# Patient Record
Sex: FEMALE | Race: WHITE | NOT HISPANIC OR LATINO | URBAN - METROPOLITAN AREA
[De-identification: names, ages, dates, MRNs, and addresses within clinical notes are randomized per-mention and may not be internally consistent; named-entity substitution may affect disease eponyms.]

---

## 2017-10-19 ENCOUNTER — OUTPATIENT (OUTPATIENT)
Dept: OUTPATIENT SERVICES | Facility: HOSPITAL | Age: 82
LOS: 1 days | Discharge: HOME | End: 2017-10-19
Payer: MEDICARE

## 2017-10-19 PROCEDURE — 90792 PSYCH DIAG EVAL W/MED SRVCS: CPT

## 2017-10-20 DIAGNOSIS — F41.1 GENERALIZED ANXIETY DISORDER: ICD-10-CM

## 2018-10-29 ENCOUNTER — EMERGENCY (EMERGENCY)
Facility: HOSPITAL | Age: 83
LOS: 1 days | Discharge: HOME | End: 2018-10-29
Attending: EMERGENCY MEDICINE | Admitting: EMERGENCY MEDICINE
Payer: MEDICARE

## 2018-10-29 VITALS
SYSTOLIC BLOOD PRESSURE: 140 MMHG | DIASTOLIC BLOOD PRESSURE: 68 MMHG | RESPIRATION RATE: 16 BRPM | OXYGEN SATURATION: 97 % | TEMPERATURE: 98 F | HEART RATE: 83 BPM

## 2018-10-29 VITALS
HEART RATE: 76 BPM | SYSTOLIC BLOOD PRESSURE: 146 MMHG | HEIGHT: 63 IN | TEMPERATURE: 97 F | WEIGHT: 149.91 LBS | RESPIRATION RATE: 16 BRPM | DIASTOLIC BLOOD PRESSURE: 86 MMHG | OXYGEN SATURATION: 96 %

## 2018-10-29 LAB
ALBUMIN SERPL ELPH-MCNC: 3.1 G/DL — LOW (ref 3.3–5)
ALP SERPL-CCNC: 68 U/L — SIGNIFICANT CHANGE UP (ref 30–120)
ALT FLD-CCNC: 22 U/L DA — SIGNIFICANT CHANGE UP (ref 10–60)
ANION GAP SERPL CALC-SCNC: 8 MMOL/L — SIGNIFICANT CHANGE UP (ref 5–17)
APPEARANCE UR: ABNORMAL
APTT BLD: 39.9 SEC — HIGH (ref 27.5–37.4)
AST SERPL-CCNC: 40 U/L — SIGNIFICANT CHANGE UP (ref 10–40)
BASOPHILS # BLD AUTO: 0.01 K/UL — SIGNIFICANT CHANGE UP (ref 0–0.2)
BASOPHILS NFR BLD AUTO: 0.1 % — SIGNIFICANT CHANGE UP (ref 0–2)
BILIRUB SERPL-MCNC: 0.3 MG/DL — SIGNIFICANT CHANGE UP (ref 0.2–1.2)
BILIRUB UR-MCNC: NEGATIVE — SIGNIFICANT CHANGE UP
BUN SERPL-MCNC: 15 MG/DL — SIGNIFICANT CHANGE UP (ref 7–23)
CALCIUM SERPL-MCNC: 9.3 MG/DL — SIGNIFICANT CHANGE UP (ref 8.4–10.5)
CHLORIDE SERPL-SCNC: 105 MMOL/L — SIGNIFICANT CHANGE UP (ref 96–108)
CO2 SERPL-SCNC: 25 MMOL/L — SIGNIFICANT CHANGE UP (ref 22–31)
COLOR SPEC: SIGNIFICANT CHANGE UP
CREAT SERPL-MCNC: 0.97 MG/DL — SIGNIFICANT CHANGE UP (ref 0.5–1.3)
DIFF PNL FLD: NEGATIVE — SIGNIFICANT CHANGE UP
EOSINOPHIL # BLD AUTO: 0.12 K/UL — SIGNIFICANT CHANGE UP (ref 0–0.5)
EOSINOPHIL NFR BLD AUTO: 1.2 % — SIGNIFICANT CHANGE UP (ref 0–6)
GLUCOSE SERPL-MCNC: 95 MG/DL — SIGNIFICANT CHANGE UP (ref 70–99)
GLUCOSE UR QL: NEGATIVE MG/DL — SIGNIFICANT CHANGE UP
HCT VFR BLD CALC: 38.8 % — SIGNIFICANT CHANGE UP (ref 34.5–45)
HGB BLD-MCNC: 12.2 G/DL — SIGNIFICANT CHANGE UP (ref 11.5–15.5)
IMM GRANULOCYTES NFR BLD AUTO: 0.6 % — SIGNIFICANT CHANGE UP (ref 0–1.5)
INR BLD: 1.51 RATIO — HIGH (ref 0.88–1.16)
KETONES UR-MCNC: NEGATIVE — SIGNIFICANT CHANGE UP
LEUKOCYTE ESTERASE UR-ACNC: ABNORMAL
LYMPHOCYTES # BLD AUTO: 2.28 K/UL — SIGNIFICANT CHANGE UP (ref 1–3.3)
LYMPHOCYTES # BLD AUTO: 23.6 % — SIGNIFICANT CHANGE UP (ref 13–44)
MCHC RBC-ENTMCNC: 30.6 PG — SIGNIFICANT CHANGE UP (ref 27–34)
MCHC RBC-ENTMCNC: 31.4 GM/DL — LOW (ref 32–36)
MCV RBC AUTO: 97.2 FL — SIGNIFICANT CHANGE UP (ref 80–100)
MONOCYTES # BLD AUTO: 0.81 K/UL — SIGNIFICANT CHANGE UP (ref 0–0.9)
MONOCYTES NFR BLD AUTO: 8.4 % — SIGNIFICANT CHANGE UP (ref 2–14)
NEUTROPHILS # BLD AUTO: 6.4 K/UL — SIGNIFICANT CHANGE UP (ref 1.8–7.4)
NEUTROPHILS NFR BLD AUTO: 66.1 % — SIGNIFICANT CHANGE UP (ref 43–77)
NITRITE UR-MCNC: POSITIVE
PH UR: 6 — SIGNIFICANT CHANGE UP (ref 5–8)
PLATELET # BLD AUTO: 223 K/UL — SIGNIFICANT CHANGE UP (ref 150–400)
POTASSIUM SERPL-MCNC: 6.1 MMOL/L — HIGH (ref 3.5–5.3)
POTASSIUM SERPL-SCNC: 6.1 MMOL/L — HIGH (ref 3.5–5.3)
PROT SERPL-MCNC: 7.3 G/DL — SIGNIFICANT CHANGE UP (ref 6–8.3)
PROT UR-MCNC: 30 MG/DL
PROTHROM AB SERPL-ACNC: 16.6 SEC — HIGH (ref 9.8–12.7)
RBC # BLD: 3.99 M/UL — SIGNIFICANT CHANGE UP (ref 3.8–5.2)
RBC # FLD: 13.6 % — SIGNIFICANT CHANGE UP (ref 10.3–14.5)
SODIUM SERPL-SCNC: 138 MMOL/L — SIGNIFICANT CHANGE UP (ref 135–145)
SP GR SPEC: 1.03 — HIGH (ref 1.01–1.02)
UROBILINOGEN FLD QL: 4 MG/DL
WBC # BLD: 9.68 K/UL — SIGNIFICANT CHANGE UP (ref 3.8–10.5)
WBC # FLD AUTO: 9.68 K/UL — SIGNIFICANT CHANGE UP (ref 3.8–10.5)

## 2018-10-29 PROCEDURE — 80053 COMPREHEN METABOLIC PANEL: CPT

## 2018-10-29 PROCEDURE — 81001 URINALYSIS AUTO W/SCOPE: CPT

## 2018-10-29 PROCEDURE — 87086 URINE CULTURE/COLONY COUNT: CPT

## 2018-10-29 PROCEDURE — 70450 CT HEAD/BRAIN W/O DYE: CPT | Mod: 26

## 2018-10-29 PROCEDURE — 96366 THER/PROPH/DIAG IV INF ADDON: CPT

## 2018-10-29 PROCEDURE — 85027 COMPLETE CBC AUTOMATED: CPT

## 2018-10-29 PROCEDURE — 70450 CT HEAD/BRAIN W/O DYE: CPT

## 2018-10-29 PROCEDURE — 72125 CT NECK SPINE W/O DYE: CPT | Mod: 26

## 2018-10-29 PROCEDURE — 85610 PROTHROMBIN TIME: CPT

## 2018-10-29 PROCEDURE — 99284 EMERGENCY DEPT VISIT MOD MDM: CPT

## 2018-10-29 PROCEDURE — 99284 EMERGENCY DEPT VISIT MOD MDM: CPT | Mod: 25

## 2018-10-29 PROCEDURE — 72125 CT NECK SPINE W/O DYE: CPT

## 2018-10-29 PROCEDURE — 85730 THROMBOPLASTIN TIME PARTIAL: CPT

## 2018-10-29 RX ORDER — CEFTRIAXONE 500 MG/1
1 INJECTION, POWDER, FOR SOLUTION INTRAMUSCULAR; INTRAVENOUS ONCE
Qty: 0 | Refills: 0 | Status: COMPLETED | OUTPATIENT
Start: 2018-10-29 | End: 2018-10-29

## 2018-10-29 RX ORDER — CEFUROXIME AXETIL 250 MG
1 TABLET ORAL
Qty: 14 | Refills: 0
Start: 2018-10-29 | End: 2018-11-04

## 2018-10-29 RX ADMIN — CEFTRIAXONE 1 GRAM(S): 500 INJECTION, POWDER, FOR SOLUTION INTRAMUSCULAR; INTRAVENOUS at 20:50

## 2018-10-29 RX ADMIN — CEFTRIAXONE 100 GRAM(S): 500 INJECTION, POWDER, FOR SOLUTION INTRAMUSCULAR; INTRAVENOUS at 20:20

## 2018-10-29 NOTE — ED PROVIDER NOTE - ATTENDING CONTRIBUTION TO CARE
I, Dr Lucas, have personally performed a face to face diagnostic evaluation on this patient with the PA/NP. I have reviewed the PA/NP's note and agree with the history, Physical exam and plan of care, As per history 83 y female poor historian,  sent to ED from [facility], for fall,  son at bedside states she has had few falls recently,  recenty moved into Assisted Living ,  has been diagnosed with depression, has recently started new medications.  PMH: BPH (benign prostatic hyperplasia)  ,Hypertension  , Skin cancer, Diabetes mellitus  ,HLD (hyperlipidemia),   Obese  ,Polyp of corpus uteri  ,Staples-Leventhal syndrome , Pulmonary Embolus.  patient is on Eliquis  patient denies chest pain, sob, no abdominal pain, no neck or back pain,  states has pain left ear.  son states few days ago she fell and hit her left ear/head on nightstand, review of imaging study and lab work were unremarkable PE no obvious gross injury from fall noted neuro intact.

## 2018-10-29 NOTE — ED PROVIDER NOTE - OBJECTIVE STATEMENT
83 y female poor historian,  sent to ED from [facility], for fall,  son at bedside states she has had few falls recently,  recenty moved into Assisted Living ,  has been diagnosed with depression, has recently started new medications.  PMH: BPH (benign prostatic hyperplasia)  ,Hypertension  , Skin cancer, Diabetes mellitus  ,HLD (hyperlipidemia),   Obese  ,Polyp of corpus uteri  ,Staples-Leventhal syndrome , Pulmonary Embolus.  patient is on Eliquis  patient denies chest pain, sob, no abdominal pain, no neck or back pain,  states has pain left ear.  son states few days ago she fell and hit her left ear/head on nightstand

## 2018-10-29 NOTE — ED ADULT NURSE NOTE - NSIMPLEMENTINTERV_GEN_ALL_ED
Implemented All Fall with Harm Risk Interventions:  [place] to call system. Call bell, personal items and telephone within reach. Instruct patient to call for assistance. Room bathroom lighting operational. Non-slip footwear when patient is off stretcher. Physically safe environment: no spills, clutter or unnecessary equipment. Stretcher in lowest position, wheels locked, appropriate side rails in place. Provide visual cue, wrist band, yellow gown, etc. Monitor gait and stability. Monitor for mental status changes and reorient to person, place, and time. Review medications for side effects contributing to fall risk. Reinforce activity limits and safety measures with patient and family. Provide visual clues: red socks.

## 2018-10-29 NOTE — ED PROVIDER NOTE - PROGRESS NOTE DETAILS
as per lab, specimen slightly hemolyzed patient resting comfortably, son at bedside , results discussed, left thyroid nodule.  follow up with Dr Barkley

## 2018-10-29 NOTE — ED PROVIDER NOTE - PMH
Diabetes mellitus    HLD (hyperlipidemia)    Hypertension    Obese    Polyp of corpus uteri    Staples-Leventhal syndrome

## 2018-10-31 LAB
CULTURE RESULTS: SIGNIFICANT CHANGE UP
SPECIMEN SOURCE: SIGNIFICANT CHANGE UP

## 2018-11-07 ENCOUNTER — EMERGENCY (EMERGENCY)
Facility: HOSPITAL | Age: 83
LOS: 1 days | Discharge: HOME | End: 2018-11-07
Attending: EMERGENCY MEDICINE | Admitting: EMERGENCY MEDICINE
Payer: MEDICARE

## 2018-11-07 VITALS
DIASTOLIC BLOOD PRESSURE: 85 MMHG | HEART RATE: 79 BPM | TEMPERATURE: 98 F | SYSTOLIC BLOOD PRESSURE: 139 MMHG | WEIGHT: 160.06 LBS | HEIGHT: 62 IN | OXYGEN SATURATION: 95 % | RESPIRATION RATE: 18 BRPM

## 2018-11-07 VITALS
OXYGEN SATURATION: 95 % | TEMPERATURE: 98 F | DIASTOLIC BLOOD PRESSURE: 79 MMHG | RESPIRATION RATE: 18 BRPM | HEART RATE: 95 BPM | SYSTOLIC BLOOD PRESSURE: 145 MMHG

## 2018-11-07 PROCEDURE — 72170 X-RAY EXAM OF PELVIS: CPT

## 2018-11-07 PROCEDURE — 72125 CT NECK SPINE W/O DYE: CPT | Mod: 26

## 2018-11-07 PROCEDURE — 73502 X-RAY EXAM HIP UNI 2-3 VIEWS: CPT

## 2018-11-07 PROCEDURE — 73502 X-RAY EXAM HIP UNI 2-3 VIEWS: CPT | Mod: 26,LT

## 2018-11-07 PROCEDURE — 93005 ELECTROCARDIOGRAM TRACING: CPT

## 2018-11-07 PROCEDURE — 93010 ELECTROCARDIOGRAM REPORT: CPT

## 2018-11-07 PROCEDURE — 70450 CT HEAD/BRAIN W/O DYE: CPT | Mod: 26

## 2018-11-07 PROCEDURE — 99284 EMERGENCY DEPT VISIT MOD MDM: CPT

## 2018-11-07 PROCEDURE — 72125 CT NECK SPINE W/O DYE: CPT

## 2018-11-07 PROCEDURE — 99284 EMERGENCY DEPT VISIT MOD MDM: CPT | Mod: 25

## 2018-11-07 PROCEDURE — 70450 CT HEAD/BRAIN W/O DYE: CPT

## 2018-11-07 NOTE — ED ADULT NURSE NOTE - NSIMPLEMENTINTERV_GEN_ALL_ED
Implemented All Fall with Harm Risk Interventions:  [place] to call system. Call bell, personal items and telephone within reach. Instruct patient to call for assistance. Room bathroom lighting operational. Non-slip footwear when patient is off stretcher. Physically safe environment: no spills, clutter or unnecessary equipment. Stretcher in lowest position, wheels locked, appropriate side rails in place. Provide visual cue, wrist band, yellow gown, etc. Monitor gait and stability. Monitor for mental status changes and reorient to person, place, and time. Review medications for side effects contributing to fall risk. Reinforce activity limits and safety measures with patient and family. Provide visual clues: red socks.

## 2018-11-07 NOTE — ED PROVIDER NOTE - OBJECTIVE STATEMENT
82 y/o F pt with hx of DM, HTN, and HLD BIBA from [facility] presents to the ED c/o pain on the back of the head and pain on the left hip s/p fall today. Pt states that she does not remember how she fell or what she was doing at the time of the fall. She fell backwards and hit the back of her head on the ground. Pain aggravated with movement and palpation.  Pt states that she gets "dizzy spells" occasionally, associated with frequent falls. Denies chest pain, abd pain, weakness or numbness in extremities, or blurring of vision. No other complaints at this time.

## 2018-11-07 NOTE — ED PROVIDER NOTE - CARE PLAN
Principal Discharge DX:	Fall, initial encounter  Secondary Diagnosis:	Injury of head, initial encounter

## 2018-11-07 NOTE — ED PROVIDER NOTE - PSH
Polyp of corpus uteri  2005  Pregnancy, ectopic, appendectomy  1961  S/P breast lumpectomy  left 1988

## 2018-11-07 NOTE — ED ADULT NURSE NOTE - OBJECTIVE STATEMENT
Brought in from Assisted Living s/p fall today. Pt unable to give any details. Stated " I can't remember "  Complaining of left hip pain on movement.

## 2018-11-07 NOTE — ED ADULT NURSE NOTE - CHIEF COMPLAINT QUOTE
Patient sent from [facility] Assisted living, s/p fall on eliquis with external signs of head trauma. patient reports occipital head pain upon palpation. reports she does not recall events of fall, potential LOC. GCS 15 in triage, patient is Hard of hearing.

## 2018-11-07 NOTE — ED ADULT TRIAGE NOTE - CHIEF COMPLAINT QUOTE
Patient sent from [facility] living, s/p fall on eliquis with external signs of head trauma. patient reports occipital head pain upon palpation. Patient sent from [facility] Assisted living, s/p fall on eliquis with external signs of head trauma. patient reports occipital head pain upon palpation. reports she does not recall events of fall, potential LOC. GCS 15 in triage, patient is Hard of hearing.

## 2018-11-07 NOTE — ED PROVIDER NOTE - NEUROLOGICAL, MLM
Alert , no focal deficits, no motor or sensory deficits, swelling on the posterior aspect of the scalp, tenderness on palpation of the posterior aspect of the scalp

## 2018-11-07 NOTE — ED PROVIDER NOTE - LOWER EXTREMITY EXAM, LEFT
TENDERNESS/tenderness on palpation of the left hip region. Pain increases on movement of the left leg.

## 2019-09-10 ENCOUNTER — EMERGENCY (EMERGENCY)
Facility: HOSPITAL | Age: 84
LOS: 1 days | Discharge: HOME | End: 2019-09-10
Attending: EMERGENCY MEDICINE | Admitting: EMERGENCY MEDICINE
Payer: MEDICARE

## 2019-09-10 VITALS
RESPIRATION RATE: 16 BRPM | DIASTOLIC BLOOD PRESSURE: 72 MMHG | TEMPERATURE: 98 F | HEART RATE: 63 BPM | HEIGHT: 62 IN | OXYGEN SATURATION: 97 % | SYSTOLIC BLOOD PRESSURE: 126 MMHG | WEIGHT: 149.91 LBS

## 2019-09-10 VITALS
HEART RATE: 65 BPM | SYSTOLIC BLOOD PRESSURE: 125 MMHG | DIASTOLIC BLOOD PRESSURE: 74 MMHG | RESPIRATION RATE: 15 BRPM | OXYGEN SATURATION: 98 %

## 2019-09-10 LAB
ALBUMIN SERPL ELPH-MCNC: 3.4 G/DL — SIGNIFICANT CHANGE UP (ref 3.3–5)
ALP SERPL-CCNC: 64 U/L — SIGNIFICANT CHANGE UP (ref 30–120)
ALT FLD-CCNC: 19 U/L DA — SIGNIFICANT CHANGE UP (ref 10–60)
ANION GAP SERPL CALC-SCNC: 6 MMOL/L — SIGNIFICANT CHANGE UP (ref 5–17)
APTT BLD: 31.9 SEC — SIGNIFICANT CHANGE UP (ref 28.5–37)
AST SERPL-CCNC: 17 U/L — SIGNIFICANT CHANGE UP (ref 10–40)
BASOPHILS # BLD AUTO: 0.01 K/UL — SIGNIFICANT CHANGE UP (ref 0–0.2)
BASOPHILS NFR BLD AUTO: 0.1 % — SIGNIFICANT CHANGE UP (ref 0–2)
BILIRUB SERPL-MCNC: 0.2 MG/DL — SIGNIFICANT CHANGE UP (ref 0.2–1.2)
BUN SERPL-MCNC: 17 MG/DL — SIGNIFICANT CHANGE UP (ref 7–23)
CALCIUM SERPL-MCNC: 9.4 MG/DL — SIGNIFICANT CHANGE UP (ref 8.4–10.5)
CHLORIDE SERPL-SCNC: 106 MMOL/L — SIGNIFICANT CHANGE UP (ref 96–108)
CO2 SERPL-SCNC: 27 MMOL/L — SIGNIFICANT CHANGE UP (ref 22–31)
CREAT SERPL-MCNC: 1.02 MG/DL — SIGNIFICANT CHANGE UP (ref 0.5–1.3)
D DIMER BLD IA.RAPID-MCNC: <150 NG/ML DDU — SIGNIFICANT CHANGE UP
EOSINOPHIL # BLD AUTO: 0.1 K/UL — SIGNIFICANT CHANGE UP (ref 0–0.5)
EOSINOPHIL NFR BLD AUTO: 0.9 % — SIGNIFICANT CHANGE UP (ref 0–6)
GLUCOSE BLDC GLUCOMTR-MCNC: 124 MG/DL — HIGH (ref 70–99)
GLUCOSE SERPL-MCNC: 173 MG/DL — HIGH (ref 70–99)
HCT VFR BLD CALC: 41.3 % — SIGNIFICANT CHANGE UP (ref 34.5–45)
HGB BLD-MCNC: 12.9 G/DL — SIGNIFICANT CHANGE UP (ref 11.5–15.5)
IMM GRANULOCYTES NFR BLD AUTO: 0.3 % — SIGNIFICANT CHANGE UP (ref 0–1.5)
INR BLD: 1.04 RATIO — SIGNIFICANT CHANGE UP (ref 0.88–1.16)
LYMPHOCYTES # BLD AUTO: 28.1 % — SIGNIFICANT CHANGE UP (ref 13–44)
LYMPHOCYTES # BLD AUTO: 3.22 K/UL — SIGNIFICANT CHANGE UP (ref 1–3.3)
MCHC RBC-ENTMCNC: 30.1 PG — SIGNIFICANT CHANGE UP (ref 27–34)
MCHC RBC-ENTMCNC: 31.2 GM/DL — LOW (ref 32–36)
MCV RBC AUTO: 96.5 FL — SIGNIFICANT CHANGE UP (ref 80–100)
MONOCYTES # BLD AUTO: 0.9 K/UL — SIGNIFICANT CHANGE UP (ref 0–0.9)
MONOCYTES NFR BLD AUTO: 7.9 % — SIGNIFICANT CHANGE UP (ref 2–14)
NEUTROPHILS # BLD AUTO: 7.17 K/UL — SIGNIFICANT CHANGE UP (ref 1.8–7.4)
NEUTROPHILS NFR BLD AUTO: 62.7 % — SIGNIFICANT CHANGE UP (ref 43–77)
NRBC # BLD: 0 /100 WBCS — SIGNIFICANT CHANGE UP (ref 0–0)
PLATELET # BLD AUTO: 227 K/UL — SIGNIFICANT CHANGE UP (ref 150–400)
POTASSIUM SERPL-MCNC: 4 MMOL/L — SIGNIFICANT CHANGE UP (ref 3.5–5.3)
POTASSIUM SERPL-SCNC: 4 MMOL/L — SIGNIFICANT CHANGE UP (ref 3.5–5.3)
PROT SERPL-MCNC: 7 G/DL — SIGNIFICANT CHANGE UP (ref 6–8.3)
PROTHROM AB SERPL-ACNC: 11.4 SEC — SIGNIFICANT CHANGE UP (ref 10–12.9)
RBC # BLD: 4.28 M/UL — SIGNIFICANT CHANGE UP (ref 3.8–5.2)
RBC # FLD: 13.1 % — SIGNIFICANT CHANGE UP (ref 10.3–14.5)
SODIUM SERPL-SCNC: 139 MMOL/L — SIGNIFICANT CHANGE UP (ref 135–145)
TROPONIN I SERPL-MCNC: 0.02 NG/ML — LOW (ref 0.02–0.06)
TROPONIN I SERPL-MCNC: 0.02 NG/ML — SIGNIFICANT CHANGE UP (ref 0.02–0.06)
WBC # BLD: 11.44 K/UL — HIGH (ref 3.8–10.5)
WBC # FLD AUTO: 11.44 K/UL — HIGH (ref 3.8–10.5)

## 2019-09-10 PROCEDURE — 36415 COLL VENOUS BLD VENIPUNCTURE: CPT

## 2019-09-10 PROCEDURE — 71045 X-RAY EXAM CHEST 1 VIEW: CPT

## 2019-09-10 PROCEDURE — 85730 THROMBOPLASTIN TIME PARTIAL: CPT

## 2019-09-10 PROCEDURE — 99284 EMERGENCY DEPT VISIT MOD MDM: CPT | Mod: 25

## 2019-09-10 PROCEDURE — 84484 ASSAY OF TROPONIN QUANT: CPT

## 2019-09-10 PROCEDURE — 85610 PROTHROMBIN TIME: CPT

## 2019-09-10 PROCEDURE — 82962 GLUCOSE BLOOD TEST: CPT

## 2019-09-10 PROCEDURE — 80053 COMPREHEN METABOLIC PANEL: CPT

## 2019-09-10 PROCEDURE — 99284 EMERGENCY DEPT VISIT MOD MDM: CPT

## 2019-09-10 PROCEDURE — 93010 ELECTROCARDIOGRAM REPORT: CPT

## 2019-09-10 PROCEDURE — 85027 COMPLETE CBC AUTOMATED: CPT

## 2019-09-10 PROCEDURE — 85379 FIBRIN DEGRADATION QUANT: CPT

## 2019-09-10 PROCEDURE — 93005 ELECTROCARDIOGRAM TRACING: CPT

## 2019-09-10 PROCEDURE — 71045 X-RAY EXAM CHEST 1 VIEW: CPT | Mod: 26

## 2019-09-10 RX ORDER — METFORMIN HYDROCHLORIDE 850 MG/1
1 TABLET ORAL
Qty: 0 | Refills: 0 | DISCHARGE

## 2019-09-10 RX ORDER — LOSARTAN POTASSIUM 100 MG/1
0 TABLET, FILM COATED ORAL
Qty: 0 | Refills: 0 | DISCHARGE

## 2019-09-10 RX ORDER — ROSUVASTATIN CALCIUM 5 MG/1
1 TABLET ORAL
Qty: 0 | Refills: 0 | DISCHARGE

## 2019-09-10 RX ORDER — ACETAMINOPHEN 500 MG
650 TABLET ORAL ONCE
Refills: 0 | Status: COMPLETED | OUTPATIENT
Start: 2019-09-10 | End: 2019-09-10

## 2019-09-10 RX ORDER — VENLAFAXINE HCL 75 MG
0 CAPSULE, EXT RELEASE 24 HR ORAL
Qty: 0 | Refills: 0 | DISCHARGE

## 2019-09-10 RX ORDER — TOPIRAMATE 25 MG
0 TABLET ORAL
Qty: 0 | Refills: 0 | DISCHARGE

## 2019-09-10 RX ORDER — APIXABAN 2.5 MG/1
1 TABLET, FILM COATED ORAL
Qty: 0 | Refills: 0 | DISCHARGE

## 2019-09-10 RX ORDER — VENLAFAXINE HCL 75 MG
1 CAPSULE, EXT RELEASE 24 HR ORAL
Qty: 0 | Refills: 0 | DISCHARGE

## 2019-09-10 RX ORDER — ATENOLOL 25 MG/1
1 TABLET ORAL
Qty: 0 | Refills: 0 | DISCHARGE

## 2019-09-10 RX ADMIN — Medication 650 MILLIGRAM(S): at 17:15

## 2019-09-10 RX ADMIN — Medication 650 MILLIGRAM(S): at 17:45

## 2019-09-10 NOTE — CONSULT NOTE ADULT - ASSESSMENT
The patient is an 84 year old female with a history of HTN, DM, PE, dementia who presents with chest pain.     Plan:  - ECG with no evidence of ischemia or infarction  - First troponin negative. Rule out acute MI with two sets of cardiac enzymes.  - D-dimer negative making PE unlikely  - Check CXR  - If above work-up negative, patient can be discharged from a cardiac perspective

## 2019-09-10 NOTE — ED ADULT NURSE REASSESSMENT NOTE - COMFORT CARE
side rails up/wait time explained/meal provided/plan of care explained/repositioned/po fluids offered

## 2019-09-10 NOTE — ED PROVIDER NOTE - OBJECTIVE STATEMENT
85 y/o female with a PMHx of Dementia, HTN, DM, PE, Anxiety, Depression presents to the ED c/o chest pain x today. Pt coming from [facility]. Pt states that she feels a mild ache on her left breast. Pt is a poor historian due to hx of dementia, HPI and ROS incomplete. PCP: Dr. Filippo Waldrop.

## 2019-09-10 NOTE — CONSULT NOTE ADULT - SUBJECTIVE AND OBJECTIVE BOX
History of Present Illness: The patient is an 84 year old female with a history of HTN, DM, PE, dementia who presents with chest pain. The pain is left-sided, sharp, non-radiating, non-exertional. It began about 3 hours ago and has been intermittent. No associated shortness of breath, dizziness, palpitations.    Past Medical/Surgical History:  HTN, DM, PE, dementia    Medications:  Home Medications:  atenolol 25 mg oral tablet: 1 tab(s) orally once a day (10 Sep 2019 12:55)  Eliquis 5 mg oral tablet: 1 tab(s) orally 2 times a day (10 Sep 2019 12:55)  glipiZIDE 2.5 mg oral tablet, extended release: 2 tab(s) orally once a day (10 Sep 2019 12:55)  glipiZIDE 2.5 mg oral tablet, extended release: 1 tab(s) orally once a day evening (10 Sep 2019 12:55)  losartan 50 mg oral tablet: orally 2 times a day (10 Sep 2019 12:55)  metFORMIN 1000 mg oral tablet: 1 tab(s) orally 2 times a day (10 Sep 2019 12:55)  rosuvastatin 20 mg oral tablet: 1 tab(s) orally once a day (at bedtime) (10 Sep 2019 12:55)  topiramate 25 mg oral tablet: orally once a day (10 Sep 2019 12:55)  venlafaxine 150 mg oral capsule, extended release: 1 cap(s) orally once a day (10 Sep 2019 12:55)  venlafaxine 37.5 mg oral tablet: orally once a day (10 Sep 2019 12:55)      Family History: Non-contributory family history of premature cardiovascular atherosclerotic disease    Social History: No tobacco, alcohol or drug use    Review of Systems:  General: No fevers, chills, weight loss or gain  Skin: No rashes, color changes  Cardiovascular: No chest pain, orthopnea  Respiratory: No shortness of breath, cough  Gastrointestinal: No nausea, abdominal pain  Genitourinary: No incontinence, pain with urination  Musculoskeletal: No pain, swelling, decreased range of motion  Neurological: No headache, weakness  Psychiatric: No depression, anxiety  Endocrine: No weight loss or gain, increased thirst  All other systems are comprehensively negative.    Physical Exam:  Vitals:        Vital Signs Last 24 Hrs  T(C): 36.8 (10 Sep 2019 12:41), Max: 36.8 (10 Sep 2019 12:41)  T(F): 98.2 (10 Sep 2019 12:41), Max: 98.2 (10 Sep 2019 12:41)  HR: 63 (10 Sep 2019 12:41) (63 - 63)  BP: 126/72 (10 Sep 2019 12:41) (126/72 - 126/72)  BP(mean): --  RR: 16 (10 Sep 2019 12:41) (16 - 16)  SpO2: 97% (10 Sep 2019 12:41) (97% - 97%)  General: NAD  HEENT: MMM  Neck: No JVD, no carotid bruit  Lungs: CTAB  CV: RRR, nl S1/S2, no M/R/G  Abdomen: S/NT/ND, +BS  Extremities: No LE edema, no cyanosis  Neuro: AAOx3, non-focal  Skin: No rash    Labs:                        12.9   11.44 )-----------( 227      ( 10 Sep 2019 12:52 )             41.3     09-10    139  |  106  |  17  ----------------------------<  173<H>  4.0   |  27  |  1.02    Ca    9.4      10 Sep 2019 12:52    TPro  7.0  /  Alb  3.4  /  TBili  0.2  /  DBili  x   /  AST  17  /  ALT  19  /  AlkPhos  64  09-10    CARDIAC MARKERS ( 10 Sep 2019 12:52 )  .016 ng/mL / x     / x     / x     / x          PT/INR - ( 10 Sep 2019 12:52 )   PT: 11.4 sec;   INR: 1.04 ratio         PTT - ( 10 Sep 2019 12:52 )  PTT:31.9 sec    ECG: NSR, LAD, nonspecific ST abnormality

## 2019-09-10 NOTE — ED ADULT NURSE NOTE - CHPI ED NUR SYMPTOMS NEG
no diaphoresis/no nausea/no syncope/no back pain/no vomiting/no chest pain/no congestion/no dizziness/no shortness of breath/no fever

## 2019-09-10 NOTE — ED PROVIDER NOTE - CONSTITUTIONAL, MLM
normal... Well appearing, well nourished, awake, alert, oriented to person, place, time/situation +confused.

## 2019-09-10 NOTE — ED PROVIDER NOTE - CARDIAC, MLM
Normal rate, regular rhythm.  Heart sounds S1, S2.  No murmurs, rubs or gallops. + chest pain reproducible on left side

## 2019-09-10 NOTE — ED ADULT NURSE NOTE - OBJECTIVE STATEMENT
recd pt  Awake and alert, confused to time/year, pt sent from [facility]. Pt states that she felt a mild ache on her left breast. Pt is a poor historian due to hx of dementia . states the pain is gone now. denies sob. Respirations are even and unlabored, lungs cta, +bowel x4 quads, abdomen soft, nontender/nondistended, no guarding, rebound or rigidity noted, skin w/d/i.

## 2019-09-10 NOTE — ED ADULT NURSE NOTE - NSIMPLEMENTINTERV_GEN_ALL_ED
Implemented All Fall with Harm Risk Interventions:  [place] to call system. Call bell, personal items and telephone within reach. Instruct patient to call for assistance. Room bathroom lighting operational. Non-slip footwear when patient is off stretcher. Physically safe environment: no spills, clutter or unnecessary equipment. Stretcher in lowest position, wheels locked, appropriate side rails in place. Provide visual cue, wrist band, yellow gown, etc. Monitor gait and stability. Monitor for mental status changes and reorient to person, place, and time. Review medications for side effects contributing to fall risk. Reinforce activity limits and safety measures with patient and family. Provide visual clues: red socks.

## 2019-09-10 NOTE — ED PROVIDER NOTE - PATIENT PORTAL LINK FT
You can access the FollowMyHealth Patient Portal offered by [facility] by registering at the following website: [email or web address]. By joining [employer]’s FollowMyHealth portal, you will also be able to view your health information using other applications (apps) compatible with our system.

## 2021-07-26 NOTE — ED PROVIDER NOTE - CARE PLAN
Principal Discharge DX:	Fall, initial encounter
Unrestricted diet/activity/Recommended medical follow-up following discharge...

## 2022-04-13 ENCOUNTER — EMERGENCY (EMERGENCY)
Facility: HOSPITAL | Age: 87
LOS: 1 days | Discharge: HOME | End: 2022-04-13
Attending: STUDENT IN AN ORGANIZED HEALTH CARE EDUCATION/TRAINING PROGRAM | Admitting: STUDENT IN AN ORGANIZED HEALTH CARE EDUCATION/TRAINING PROGRAM
Payer: MEDICARE

## 2022-04-13 VITALS
RESPIRATION RATE: 16 BRPM | OXYGEN SATURATION: 98 % | HEART RATE: 67 BPM | SYSTOLIC BLOOD PRESSURE: 166 MMHG | DIASTOLIC BLOOD PRESSURE: 77 MMHG | TEMPERATURE: 98 F

## 2022-04-13 VITALS
TEMPERATURE: 97 F | DIASTOLIC BLOOD PRESSURE: 73 MMHG | WEIGHT: 139.99 LBS | SYSTOLIC BLOOD PRESSURE: 140 MMHG | HEIGHT: 62 IN | RESPIRATION RATE: 16 BRPM | HEART RATE: 72 BPM

## 2022-04-13 LAB
ALBUMIN SERPL ELPH-MCNC: 3.1 G/DL — LOW (ref 3.3–5)
ALP SERPL-CCNC: 59 U/L — SIGNIFICANT CHANGE UP (ref 40–120)
ALT FLD-CCNC: 18 U/L — SIGNIFICANT CHANGE UP (ref 12–78)
ANION GAP SERPL CALC-SCNC: 7 MMOL/L — SIGNIFICANT CHANGE UP (ref 5–17)
AST SERPL-CCNC: 11 U/L — LOW (ref 15–37)
BASOPHILS # BLD AUTO: 0.01 K/UL — SIGNIFICANT CHANGE UP (ref 0–0.2)
BASOPHILS NFR BLD AUTO: 0.1 % — SIGNIFICANT CHANGE UP (ref 0–2)
BILIRUB SERPL-MCNC: 0.2 MG/DL — SIGNIFICANT CHANGE UP (ref 0.2–1.2)
BUN SERPL-MCNC: 16 MG/DL — SIGNIFICANT CHANGE UP (ref 7–23)
CALCIUM SERPL-MCNC: 9.1 MG/DL — SIGNIFICANT CHANGE UP (ref 8.5–10.1)
CHLORIDE SERPL-SCNC: 109 MMOL/L — HIGH (ref 96–108)
CO2 SERPL-SCNC: 26 MMOL/L — SIGNIFICANT CHANGE UP (ref 22–31)
CREAT SERPL-MCNC: 1.1 MG/DL — SIGNIFICANT CHANGE UP (ref 0.5–1.3)
D DIMER BLD IA.RAPID-MCNC: <150 NG/ML DDU — SIGNIFICANT CHANGE UP
EGFR: 50 ML/MIN/1.73M2 — LOW
EOSINOPHIL # BLD AUTO: 0.15 K/UL — SIGNIFICANT CHANGE UP (ref 0–0.5)
EOSINOPHIL NFR BLD AUTO: 1.6 % — SIGNIFICANT CHANGE UP (ref 0–6)
GLUCOSE SERPL-MCNC: 117 MG/DL — HIGH (ref 70–99)
HCT VFR BLD CALC: 37.4 % — SIGNIFICANT CHANGE UP (ref 34.5–45)
HGB BLD-MCNC: 12.1 G/DL — SIGNIFICANT CHANGE UP (ref 11.5–15.5)
IMM GRANULOCYTES NFR BLD AUTO: 0.5 % — SIGNIFICANT CHANGE UP (ref 0–1.5)
LYMPHOCYTES # BLD AUTO: 2.95 K/UL — SIGNIFICANT CHANGE UP (ref 1–3.3)
LYMPHOCYTES # BLD AUTO: 31.1 % — SIGNIFICANT CHANGE UP (ref 13–44)
MCHC RBC-ENTMCNC: 30.3 PG — SIGNIFICANT CHANGE UP (ref 27–34)
MCHC RBC-ENTMCNC: 32.4 GM/DL — SIGNIFICANT CHANGE UP (ref 32–36)
MCV RBC AUTO: 93.7 FL — SIGNIFICANT CHANGE UP (ref 80–100)
MONOCYTES # BLD AUTO: 1.02 K/UL — HIGH (ref 0–0.9)
MONOCYTES NFR BLD AUTO: 10.7 % — SIGNIFICANT CHANGE UP (ref 2–14)
NEUTROPHILS # BLD AUTO: 5.32 K/UL — SIGNIFICANT CHANGE UP (ref 1.8–7.4)
NEUTROPHILS NFR BLD AUTO: 56 % — SIGNIFICANT CHANGE UP (ref 43–77)
NRBC # BLD: 0 /100 WBCS — SIGNIFICANT CHANGE UP (ref 0–0)
PLATELET # BLD AUTO: 232 K/UL — SIGNIFICANT CHANGE UP (ref 150–400)
POTASSIUM SERPL-MCNC: 4 MMOL/L — SIGNIFICANT CHANGE UP (ref 3.5–5.3)
POTASSIUM SERPL-SCNC: 4 MMOL/L — SIGNIFICANT CHANGE UP (ref 3.5–5.3)
PROT SERPL-MCNC: 6.7 G/DL — SIGNIFICANT CHANGE UP (ref 6–8.3)
RBC # BLD: 3.99 M/UL — SIGNIFICANT CHANGE UP (ref 3.8–5.2)
RBC # FLD: 13.8 % — SIGNIFICANT CHANGE UP (ref 10.3–14.5)
SODIUM SERPL-SCNC: 142 MMOL/L — SIGNIFICANT CHANGE UP (ref 135–145)
TROPONIN I, HIGH SENSITIVITY RESULT: 28.1 NG/L — SIGNIFICANT CHANGE UP
WBC # BLD: 9.5 K/UL — SIGNIFICANT CHANGE UP (ref 3.8–10.5)
WBC # FLD AUTO: 9.5 K/UL — SIGNIFICANT CHANGE UP (ref 3.8–10.5)

## 2022-04-13 PROCEDURE — 80053 COMPREHEN METABOLIC PANEL: CPT

## 2022-04-13 PROCEDURE — 99285 EMERGENCY DEPT VISIT HI MDM: CPT | Mod: 25

## 2022-04-13 PROCEDURE — 85025 COMPLETE CBC W/AUTO DIFF WBC: CPT

## 2022-04-13 PROCEDURE — 85379 FIBRIN DEGRADATION QUANT: CPT

## 2022-04-13 PROCEDURE — 99285 EMERGENCY DEPT VISIT HI MDM: CPT

## 2022-04-13 PROCEDURE — 93010 ELECTROCARDIOGRAM REPORT: CPT

## 2022-04-13 PROCEDURE — 84484 ASSAY OF TROPONIN QUANT: CPT

## 2022-04-13 PROCEDURE — 71045 X-RAY EXAM CHEST 1 VIEW: CPT

## 2022-04-13 PROCEDURE — 93005 ELECTROCARDIOGRAM TRACING: CPT

## 2022-04-13 PROCEDURE — 71045 X-RAY EXAM CHEST 1 VIEW: CPT | Mod: 26

## 2022-04-13 PROCEDURE — 36415 COLL VENOUS BLD VENIPUNCTURE: CPT

## 2022-04-13 NOTE — ED PROVIDER NOTE - OBJECTIVE STATEMENT
87yo F from [facility], ho, htn dm, dementia, remote ho pe not on a/c pw episode of chest pain. pt sons state they called ambulance bec she complains of chest pain, pt does not recall having pain and no longer has pain, denies sob. per son who sees her frequently has not been having chest pain or sob prior to today. no coughing, no fevers 85yo F from [facility], ho, htn dm, dementia, remote ho pe not on a/c pw episode of chest pain. pt sons state they called ambulance bec she complains of chest pain, symptoms were at about 530-6   pt does not recall having pain and no longer has pain, denies sob. per son who sees her frequently has not been having chest pain or sob prior to today. no coughing, no fevers

## 2022-04-13 NOTE — ED ADULT NURSE NOTE - CHIEF COMPLAINT QUOTE
Patient brought in by ambulance from [facility] complaining of chest pain was given aspirin 324 mg

## 2022-04-13 NOTE — ED ADULT TRIAGE NOTE - CHIEF COMPLAINT QUOTE
Patient brought in by ambulance from [facility] complaining of chest pain was given aspirin 324 mg

## 2022-04-13 NOTE — ED PROVIDER NOTE - PATIENT PORTAL LINK FT
You can access the FollowMyHealth Patient Portal offered by [facility] by registering at the following website: [email or web address]. By joining [employer]’s FollowMyHealth portal, you will also be able to view your health information using other applications (apps) compatible with our system.

## 2022-04-13 NOTE — ED PROVIDER NOTE - CARE PROVIDER_API CALL
Leighann Rodriguez)  Internal Medicine  [address] [place] [address]  Phone: [phone]  Fax: [phone]  Follow Up Time: 4-6 Days

## 2022-04-13 NOTE — ED ADULT NURSE NOTE - NSIMPLEMENTINTERV_GEN_ALL_ED
Implemented All Fall with Harm Risk Interventions:  [place] to call system. Call bell, personal items and telephone within reach. Instruct patient to call for assistance. Room bathroom lighting operational. Non-slip footwear when patient is off stretcher. Physically safe environment: no spills, clutter or unnecessary equipment. Stretcher in lowest position, wheels locked, appropriate side rails in place. Provide visual cue, wrist band, yellow gown, etc. Monitor gait and stability. Monitor for mental status changes and reorient to person, place, and time. Review medications for side effects contributing to fall risk. Reinforce activity limits and safety measures with patient and family. Provide visual clues: red socks.

## 2023-01-01 NOTE — ED ADULT NURSE NOTE - NS PRO AD NO ADVANCE DIRECTIVE
[facility] - Exome Information Sheet    Proband name: Reshma Law  Proband MRN: [id number]    Medical Urgency: Standard (6-8 weeks)    If results are requested by a specific date, please indicate here: N/A    Exome analysis is currently validated for peripheral blood specimens. Other specimen types require a laboratory approved exception. If an exception is requested, please provide the clinical rationale for the exception below: N/A    Consented to [facility]CM[facility] secondary findings?   Proband:  Yes  Mother:  Yes  Father:  Yes  *If needed, add additional relatives and secondary findings preferences below    Family members to include in exome analysis:    Initials and MRN: LPA, [id number]  Relationship to proband: Mother  Affected? No    Initials and MRN: LRL, [id number]  Relationship to proband: Father  Affected? No    Primary clinical concerns relevant to exome analysis:  Seizures  Microcephaly  Gross motor delay  Possible regression  Strabismus  Low tone   Mild dysmorphism     Suspected diagnosis: Any seizure vs. neurodevelopmental disorder    Relevant previous testing (e.g., genetic and/or biochemical testing,  screening, imaging, etc.):  Per prior work-up, there is low concern for metabolic, infectious, traumatic, or ischemic causes that would explain her recurrent seizures     Other notes/special concerns: None    Fariha Gilliam MS, [facility]  Licensed Genetic Counselor  [facility], [place]  Phone: [phone]    
No

## 2023-08-17 NOTE — ED ADULT NURSE NOTE - NSFALLRSKASSESASSIST_ED_ALL_ED
NEUROLOGY CONSULT    DOS: 2023  NAME: Flako Coreas   : 1965  PCP: Akash Rodriguez Jr., DO  CC: Stroke  Referring MD: No ref. provider found    Neurological Problem and Interval History:   58 y.o. who presents with Sx of left cerebellar ischemic stroke.  Patient presented with dizziness and gait imbalance nausea falling toward the right.  Patient was found to have hypertensive emergency with 220/141.  Patient notes that he continually runs hypertensive.  MRI did reveal a left cerebellar ischemic stroke.  This is likely small vessel ischemic disease in etiology.  Patient has multiple cerebrovascular risk factors.    Past Medical/Surgical Hx:  Past Medical History:   Diagnosis Date    ADHD (attention deficit hyperactivity disorder)     On going issue    Anxiety     Lepe's esophagus     Benign prostatic hyperplasia Surgery in 2021    Clotting disorder Intestinal    Depression     Diverticulitis     Diverticulosis     Duodenitis     Enteritis     Failure to thrive (child)     Family history of blood clots     GERD (gastroesophageal reflux disease)     Hyperlipidemia     Hypertension     Hypertensive emergency     Low testosterone     Microcytosis     Pancreatitis     Pneumonia     PONV (postoperative nausea and vomiting)     Seasonal affective disorder     Shoulder injury     Unexplained weight loss      Past Surgical History:   Procedure Laterality Date    COLON SURGERY      COLONOSCOPY      COLONOSCOPY N/A 2022    Procedure: COLONOSCOPY INTO CECUM WITH HOT SNARE POLYPECTOMY;  Surgeon: Albert Gallo MD;  Location: [facility] ENDOSCOPY;  Service: Gastroenterology;  Laterality: N/A;  PRE- GI BLEED  POST- DIVERTICULOSIS, POLYP    ENDOSCOPY N/A 12/10/2022    Procedure: ESOPHAGOGASTRODUODENOSCOPY;  Surgeon: Albert Gallo MD;  Location: [facility] ENDOSCOPY;  Service: Gastroenterology;  Laterality: N/A;  PRE- DARK STOOLS  POST- BARRETTS ESOPHAGUS    FRACTURE SURGERY      for broken arm     FRACTURE SURGERY      for broken hand    INCISION AND DRAINAGE ABSCESS  2015    anal    PROSTATE SURGERY      SMALL INTESTINE SURGERY      TONSILLECTOMY         Medications On Admission  Medications Prior to Admission   Medication Sig Dispense Refill Last Dose    amLODIPine (NORVASC) 10 MG tablet Take 1 tablet by mouth Daily. 90 tablet 1 8/15/2023    hydrALAZINE (APRESOLINE) 50 MG tablet Take 1 tablet by mouth Every 8 (Eight) Hours. 90 tablet 2 8/16/2023    lisinopril (PRINIVIL,ZESTRIL) 40 MG tablet Take 1 tablet by mouth 2 (Two) Times a Day. 180 tablet 0 8/16/2023    ondansetron (ZOFRAN) 8 MG tablet Take 1 tablet by mouth Every 8 (Eight) Hours As Needed for Vomiting or Nausea. 30 tablet 0 Past Month    vilazodone (Viibryd) 40 MG tablet tablet Take 1 tablet by mouth Daily. 90 tablet 1 8/16/2023    amitriptyline (ELAVIL) 25 MG tablet Take 1 tablet by mouth Every Night. (Patient taking differently: Take 1 tablet by mouth At Night As Needed.) 90 tablet 1 Unknown    pantoprazole (PROTONIX) 40 MG EC tablet Take 1 tablet by mouth 2 (Two) Times a Day Before Meals. 60 tablet 0     rosuvastatin (Crestor) 10 MG tablet Take 1 tablet by mouth Daily. (Patient taking differently: Take 1 tablet by mouth Every Night.) 90 tablet 1     sucralfate (CARAFATE) 1 g tablet Take 1 tablet by mouth 4 (Four) Times a Day. 20 tablet 0        Allergies:  No Known Allergies    Social Hx:  Social History     Socioeconomic History    Marital status: Single   Tobacco Use    Smoking status: Never    Smokeless tobacco: Never   Vaping Use    Vaping Use: Never used   Substance and Sexual Activity    Alcohol use: Yes     Alcohol/week: 20.0 standard drinks     Types: 10 Cans of beer, 10 Shots of liquor per week     Comment: pt states very rarely    Drug use: Yes     Frequency: 1.0 times per week     Types: Marijuana     Comment: Pt states he may have smoked marijuana 3 weeks ago (stated on 11-07-18)    Sexual activity: Not Currently     Partners: Female        Family Hx:  Family History   Problem Relation Age of Onset    Stroke Mother     Stroke Father        Review of Imaging (Interpretation of images not reports):  -MRI brain:  2 small acute infarcts in inferior aspect of the left  cerebellum. No evidence of hemorrhage or mass effect.    -CTAs:  1. Initial noncontrast CT scan of the brain was performed. The  ventricles are normal in size and midline. There is no evidence of  intracranial hemorrhage or mass effect. The sinuses and mastoid air  cells are clear.     2. Evaluation for stenosis is based on NASCET criteria. The vertebral  arteries are patent with dominance of the right vertebral artery. The  left vertebral artery becomes extremely small in caliber distal to the  left PICA.     3. There is calcification involving the left and right carotid  bifurcations. There is no NASCET significant stenosis of the cervical  carotid arteries.     4. The intracranial CT angiography shows no stenosis of the major  vessels. The right posterior communicating artery is patent. No aneurysm  is seen.    Laboratory Results:   Lab Results   Component Value Date    GLUCOSE 96 08/17/2023    CALCIUM 8.8 08/17/2023     08/17/2023    K 3.9 08/17/2023    CO2 21.7 (L) 08/17/2023     08/17/2023    BUN 13 08/17/2023    CREATININE 0.88 08/17/2023    EGFRIFAFRI >60 09/28/2018    EGFRIFNONA 98 11/05/2018    BCR 14.8 08/17/2023    ANIONGAP 12.3 08/17/2023     Lab Results   Component Value Date    WBC 8.26 08/17/2023    HGB 14.5 08/17/2023    HCT 44.0 08/17/2023    MCV 78.7 (L) 08/17/2023     08/17/2023     Lab Results   Component Value Date    CHOL 194 08/16/2023     Lab Results   Component Value Date    HDL 41 08/16/2023    HDL 46 05/11/2022    HDL 44 03/30/2021     Lab Results   Component Value Date     (H) 08/16/2023     (H) 05/11/2022     (H) 03/30/2021     Lab Results   Component Value Date    TRIG 194 (H) 08/16/2023    TRIG 149 05/11/2022     "TRIG 112 03/30/2021     Lab Results   Component Value Date    HGBA1C 5.20 08/16/2023     Lab Results   Component Value Date    INR 0.94 12/11/2022    INR 1.0 09/18/2018    INR 1.0 08/10/2018    PROTIME 12.7 12/11/2022    PROTIME 11.6 09/18/2018    PROTIME 10.4 08/10/2018         Physical Examination:   /99 (BP Location: Right arm, Patient Position: Lying)   Pulse 67   Temp 97.5 øF (36.4 øC) (Oral)   Resp 18   Ht 182.9 cm (72\")   Wt 105 kg (232 lb)   SpO2 95%   BMI 31.46 kg/mý       NIHSS:     0-->Alert: keenly responsive  0-->Answers both questions correctly  0-->Performs both tasks correctly  0=normal  0=No visual loss  0=Normal symmetric movement  0-->No drift: limb holds 90 (or 45) degrees for full 10 secs  0-->No drift: limb holds 90 (or 45) degrees for full 10 secs  0-->No drift: limb holds 90 (or 45) degrees for full 10 secs  0-->No drift: limb holds 90 (or 45) degrees for full 10 secs  0=Absent  0=Normal; no sensory loss  0=No aphasia, normal  0=Normal  0=No abnormality    Total score: 0      Diagnoses / Discussion:  58 y.o. who presents with Sx of left cerebellar ischemic stroke.  actors.    Plan:  MRI brain left cerebellar stroke  CTAs left vertebral artery diminutive after PICA  TTE  , statin  A1c 5.2  Aspirin and Plavix x21 days followed by aspirin monotherapy thereafter  Holter monitor on discharge  Blood pressure management per the primary team and cardiology     I have discussed the above with the patient and family.  Time spent with patient: 60min    MDM  Reviewed: previous chart, nursing note and vitals  Reviewed previous: labs, CT scan and MRI  Interpretation: labs, CT scan and MRI  Total time providing critical care: 30-74 minutes. This excludes time spent performing separately reportable procedures and services.  Consults: neurology       Georgina Kraus MD  Neurology             " yes

## 2023-10-15 NOTE — ED ADULT NURSE NOTE - DOES PATIENT HAVE ADVANCE DIRECTIVE
"[facility]  FERNANDA MURILLO  PHONE  [phone]  FAX  [phone]  NPI:  [id number]    CLINICAL UPDATE    Daniel Robertson (84 y.o. Male)       Date of Birth   1939    Social Security Number       Address   [address] [facility] [address]    Home Phone   [phone]    MRN   [id number]       [employer]   None    Marital Status   Unknown                            Admission Date   10/6/23    Admission Type   Emergency    Admitting Provider   Jose Bunch MD    Attending Provider   Hannah Matthews MD    Department, Room/Bed   [address], [id number] [phone]  11/1/1994 - None Entered    [facility] [address]         Subscriber Name Subscriber Birth Date Member ID       DANIEL ROBERTSON 1939 [id number]               Secondary Coverage       Payor Plan Insurance Group Employer/Plan Group    [facility] DEPT [id number]        Payor Plan Address Payor Plan Phone Number Payor Plan Fax Number Effective Dates    [facility] OFFICE OF [facility] CARE [phone]  10/6/2023 - None Entered    [address] [address]       [facility] [address]         Subscriber Name Subscriber Birth Date Member ID       DANIEL ROBERTSON 1939 [id number]                     Emergency Contacts        (Rel.) Home Phone Work Phone Mobile Phone    zora bobo (Daughter) [phone] -- -- "              [phone]  Visit Number:  [id number]  Current 1043       Reji Alexander PA-C at 10/14/23 1527       Attestation signed by Hannah Matthews MD at 10/14/23 6832    I have reviewed this documentation and agree.                  Patient Identification:  Name:  Daniel Dietz  Age:  84 y.o.  Sex:  male  :  1939  MRN:  [id number]  Visit Number:  [id number]  Current BRANDI on likely underlying CKD, BRANDI Yes

## 2024-04-11 NOTE — ED PROVIDER NOTE - DISCHARGE REVIEW MATERIAL PRESENTED
"Daily Note     Today's date: 2024  Patient name: Angie Eastman  : 1942  MRN: [id number]  Referring provider: Chema Ruiz MD  Dx:   Encounter Diagnosis     ICD-10-CM    1. Primary osteoarthritis of right knee  M17.11       2. Chronic pain of right knee  M25.561     G89.29           Start Time: 1056  Stop Time: 1200  Total time in clinic (min): 64 minutes    Subjective: I am feeling pretty good.       Objective: See treatment diary below      Assessment: Tolerated treatment well. Patient would benefit from continued PT  pt completes her program today with good tolerance to her right knee. She had some difficulty with standing hip abd and ext. She felt better with step ups . She feels improvement with her knee strength.   She remains tight in both hamstrings today.     Plan: Continue per plan of care.      Precautions: Hx of polio with R LE involvement.       Date 4/2 4/4 4/9 4/11 3/28 Reassess   FOTO     51 SC   Manuals        Passive hamstring stretch ds ds jf JF 30\" 5x           Neuro Re-Ed        SLS- foam 4x 15\" 15\" 4x ea.  4x 15\" ea 4 x 15 \" 20\" 4x    Tandem Walk-foam 5 laps 5 laps f/b 5 laps 5 laps 5 laps   Towel  press 20x 5\" 20x 5\" 20x 5\" 20 x 5 \"    Side to side  5 laps 5 laps 5 laps            Ther Ex        LAQ 3# 2/10 3# 20x 3# 20x 3# 20 x 3# 20x   SHC 3# 20x  3# 20x 3# 20x 3#  20 x    NuStep for strengthening 8m L5 8m L5 8m L5 8m L5 Bike L3 8 min   Standing hip abd/ ext 3# 20x ea 3# 20x ea.  3# 20x ea 3# 20 x    SAQ 3 #  20 x 3# 20x 3# 20x  3#  20 x 3# 5\" 20x   Marching  3#  20x seated 3# 20  seated 3# 20x ea 3#  20 x    Bridge c add   20x 5\" add only 20 x 5 \"  add     Ther Activity        Squats 10x 10x  10x 20 x  20x   Lunges 10x held held  NV   Step down 4\" 10x  4\" 10x np  4\" 10x   Step up Nv 6\" 20x 6' 20x  6\" 20 x 6\" 20x    Gait Training                Modalities                                     "
.
